# Patient Record
Sex: FEMALE | URBAN - METROPOLITAN AREA
[De-identification: names, ages, dates, MRNs, and addresses within clinical notes are randomized per-mention and may not be internally consistent; named-entity substitution may affect disease eponyms.]

---
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
## 2024-02-28 ENCOUNTER — OFFICE VISIT (OUTPATIENT)
Dept: URBAN - METROPOLITAN AREA CLINIC 18 | Facility: CLINIC | Age: 19
End: 2024-02-28
Payer: COMMERCIAL

## 2024-02-28 DIAGNOSIS — H52.223 REGULAR ASTIGMATISM, BILATERAL: Primary | ICD-10-CM

## 2024-02-28 PROCEDURE — 92012 INTRM OPH EXAM EST PATIENT: CPT

## 2024-02-28 ASSESSMENT — VISUAL ACUITY
OS: 20/20
OD: 20/20

## 2024-02-28 ASSESSMENT — INTRAOCULAR PRESSURE
OD: 16
OS: 17